# Patient Record
Sex: FEMALE | Race: WHITE
[De-identification: names, ages, dates, MRNs, and addresses within clinical notes are randomized per-mention and may not be internally consistent; named-entity substitution may affect disease eponyms.]

---

## 2019-08-30 ENCOUNTER — HOSPITAL ENCOUNTER (EMERGENCY)
Dept: HOSPITAL 94 - ER | Age: 39
LOS: 1 days | Discharge: LEFT BEFORE DISCHARGE | End: 2019-08-31
Payer: MEDICAID

## 2019-08-30 VITALS — BODY MASS INDEX: 21.11 KG/M2 | WEIGHT: 134.48 LBS | HEIGHT: 67 IN

## 2019-08-30 DIAGNOSIS — Z53.21: ICD-10-CM

## 2019-08-30 DIAGNOSIS — R30.9: Primary | ICD-10-CM

## 2019-08-30 PROCEDURE — 87491 CHLMYD TRACH DNA AMP PROBE: CPT

## 2019-08-30 PROCEDURE — 81025 URINE PREGNANCY TEST: CPT

## 2019-08-30 PROCEDURE — 81001 URINALYSIS AUTO W/SCOPE: CPT

## 2019-08-30 PROCEDURE — 87088 URINE BACTERIA CULTURE: CPT

## 2019-08-30 PROCEDURE — 36415 COLL VENOUS BLD VENIPUNCTURE: CPT

## 2019-08-31 VITALS — SYSTOLIC BLOOD PRESSURE: 116 MMHG | DIASTOLIC BLOOD PRESSURE: 80 MMHG

## 2019-08-31 LAB
BACTERIA URNS QL MICRO: (no result) /HPF
CLARITY UR: (no result)
COLOR UR: YELLOW
DEPRECATED SQUAMOUS URNS QL MICRO: (no result) /LPF
GLUCOSE UR STRIP-MCNC: NEGATIVE MG/DL
HCG UR QL: NEGATIVE
HGB UR QL STRIP: (no result)
KETONES UR STRIP-MCNC: NEGATIVE MG/DL
LEUKOCYTE ESTERASE UR QL STRIP: (no result)
MUCOUS THREADS URNS QL MICRO: (no result) /LPF
NITRITE UR QL STRIP: NEGATIVE
PH UR STRIP: 5.5 [PH] (ref 4.8–8)
PROT UR QL STRIP: 100 MG/DL
RBC #/AREA URNS HPF: (no result) /HPF (ref 0–2)
SP GR UR STRIP: >=1.03 (ref 1–1.03)
URN COLLECT METHOD CLASS: (no result)
UROBILINOGEN UR STRIP-MCNC: 0.2 E.U/DL (ref 0.2–1)
WBC #/AREA URNS HPF: (no result) /HPF (ref 0–4)

## 2019-11-16 ENCOUNTER — HOSPITAL ENCOUNTER (EMERGENCY)
Dept: HOSPITAL 94 - ER | Age: 39
Discharge: HOME | End: 2019-11-16
Payer: MEDICAID

## 2019-11-16 VITALS — HEIGHT: 67 IN | BODY MASS INDEX: 18.34 KG/M2 | WEIGHT: 116.84 LBS

## 2019-11-16 VITALS — DIASTOLIC BLOOD PRESSURE: 79 MMHG | SYSTOLIC BLOOD PRESSURE: 113 MMHG

## 2019-11-16 DIAGNOSIS — H10.89: Primary | ICD-10-CM

## 2019-11-16 DIAGNOSIS — Z79.899: ICD-10-CM

## 2019-11-16 DIAGNOSIS — F11.90: ICD-10-CM

## 2019-11-16 DIAGNOSIS — Z88.1: ICD-10-CM

## 2019-11-16 DIAGNOSIS — Z86.14: ICD-10-CM

## 2019-11-16 DIAGNOSIS — J06.9: ICD-10-CM

## 2019-11-16 DIAGNOSIS — F15.90: ICD-10-CM

## 2019-11-16 DIAGNOSIS — Z98.890: ICD-10-CM

## 2019-11-16 PROCEDURE — 99283 EMERGENCY DEPT VISIT LOW MDM: CPT

## 2020-01-09 ENCOUNTER — HOSPITAL ENCOUNTER (EMERGENCY)
Dept: HOSPITAL 94 - ER | Age: 40
Discharge: HOME | End: 2020-01-09
Payer: COMMERCIAL

## 2020-01-09 VITALS — DIASTOLIC BLOOD PRESSURE: 72 MMHG | SYSTOLIC BLOOD PRESSURE: 105 MMHG

## 2020-01-09 VITALS — BODY MASS INDEX: 22.02 KG/M2 | HEIGHT: 67 IN | WEIGHT: 140.3 LBS

## 2020-01-09 DIAGNOSIS — Z88.1: ICD-10-CM

## 2020-01-09 DIAGNOSIS — F15.90: ICD-10-CM

## 2020-01-09 DIAGNOSIS — K02.9: ICD-10-CM

## 2020-01-09 DIAGNOSIS — F11.90: ICD-10-CM

## 2020-01-09 DIAGNOSIS — Z98.890: ICD-10-CM

## 2020-01-09 DIAGNOSIS — K04.7: Primary | ICD-10-CM

## 2020-01-09 DIAGNOSIS — Z86.14: ICD-10-CM

## 2020-01-09 PROCEDURE — 99283 EMERGENCY DEPT VISIT LOW MDM: CPT

## 2020-02-20 ENCOUNTER — HOSPITAL ENCOUNTER (EMERGENCY)
Dept: HOSPITAL 94 - ER | Age: 40
Discharge: HOME | End: 2020-02-20
Payer: COMMERCIAL

## 2020-02-20 VITALS — WEIGHT: 138.3 LBS | BODY MASS INDEX: 21.71 KG/M2 | HEIGHT: 67 IN

## 2020-02-20 VITALS — SYSTOLIC BLOOD PRESSURE: 92 MMHG | DIASTOLIC BLOOD PRESSURE: 63 MMHG

## 2020-02-20 DIAGNOSIS — F11.90: ICD-10-CM

## 2020-02-20 DIAGNOSIS — Z79.899: ICD-10-CM

## 2020-02-20 DIAGNOSIS — Z86.69: ICD-10-CM

## 2020-02-20 DIAGNOSIS — L02.413: Primary | ICD-10-CM

## 2020-02-20 DIAGNOSIS — Z86.14: ICD-10-CM

## 2020-02-20 DIAGNOSIS — Z98.890: ICD-10-CM

## 2020-02-20 DIAGNOSIS — Z88.8: ICD-10-CM

## 2020-02-20 DIAGNOSIS — F15.90: ICD-10-CM

## 2020-02-20 PROCEDURE — 96372 THER/PROPH/DIAG INJ SC/IM: CPT

## 2020-02-20 PROCEDURE — 99284 EMERGENCY DEPT VISIT MOD MDM: CPT

## 2020-02-20 PROCEDURE — 10060 I&D ABSCESS SIMPLE/SINGLE: CPT

## 2020-09-05 ENCOUNTER — HOSPITAL ENCOUNTER (EMERGENCY)
Dept: HOSPITAL 94 - ER | Age: 40
Discharge: HOME | End: 2020-09-05
Payer: MEDICAID

## 2020-09-05 VITALS — DIASTOLIC BLOOD PRESSURE: 94 MMHG | SYSTOLIC BLOOD PRESSURE: 151 MMHG

## 2020-09-05 VITALS — HEIGHT: 67 IN | WEIGHT: 140.3 LBS | BODY MASS INDEX: 22.02 KG/M2

## 2020-09-05 DIAGNOSIS — L02.416: ICD-10-CM

## 2020-09-05 DIAGNOSIS — F11.10: ICD-10-CM

## 2020-09-05 DIAGNOSIS — Z88.8: ICD-10-CM

## 2020-09-05 DIAGNOSIS — L03.116: Primary | ICD-10-CM

## 2020-09-05 DIAGNOSIS — Z86.14: ICD-10-CM

## 2020-09-05 DIAGNOSIS — Z79.2: ICD-10-CM

## 2020-09-05 DIAGNOSIS — Z79.899: ICD-10-CM

## 2020-09-05 DIAGNOSIS — Z98.890: ICD-10-CM

## 2020-09-05 DIAGNOSIS — F15.90: ICD-10-CM

## 2020-09-05 DIAGNOSIS — R22.43: ICD-10-CM

## 2020-09-05 DIAGNOSIS — Z86.69: ICD-10-CM

## 2020-09-05 PROCEDURE — 96372 THER/PROPH/DIAG INJ SC/IM: CPT

## 2020-09-05 PROCEDURE — 90715 TDAP VACCINE 7 YRS/> IM: CPT

## 2020-09-05 PROCEDURE — 99284 EMERGENCY DEPT VISIT MOD MDM: CPT

## 2020-09-05 PROCEDURE — 90471 IMMUNIZATION ADMIN: CPT

## 2020-10-25 ENCOUNTER — HOSPITAL ENCOUNTER (EMERGENCY)
Dept: HOSPITAL 94 - ER | Age: 40
Discharge: LEFT BEFORE DISCHARGE | End: 2020-10-25
Payer: MEDICAID

## 2020-10-25 VITALS — BODY MASS INDEX: 30.07 KG/M2 | WEIGHT: 198.42 LBS | HEIGHT: 68 IN

## 2020-10-25 VITALS — SYSTOLIC BLOOD PRESSURE: 113 MMHG | DIASTOLIC BLOOD PRESSURE: 73 MMHG

## 2020-10-25 DIAGNOSIS — Z53.21: ICD-10-CM

## 2020-10-25 DIAGNOSIS — R10.9: Primary | ICD-10-CM

## 2020-10-25 NOTE — NUR
Left before being seen, was seen leaving facility on skateboard.  Not necessary 
to call back patient per Dr Dean.

## 2020-11-07 ENCOUNTER — HOSPITAL ENCOUNTER (EMERGENCY)
Dept: HOSPITAL 94 - ER | Age: 40
Discharge: HOME | End: 2020-11-07
Payer: MEDICAID

## 2020-11-07 VITALS — WEIGHT: 145.31 LBS | HEIGHT: 67 IN | BODY MASS INDEX: 22.81 KG/M2

## 2020-11-07 VITALS — DIASTOLIC BLOOD PRESSURE: 73 MMHG | SYSTOLIC BLOOD PRESSURE: 107 MMHG

## 2020-11-07 DIAGNOSIS — R05: Primary | ICD-10-CM

## 2020-11-07 DIAGNOSIS — R43.9: ICD-10-CM

## 2020-11-07 DIAGNOSIS — F11.10: ICD-10-CM

## 2020-11-07 DIAGNOSIS — Z88.8: ICD-10-CM

## 2020-11-07 DIAGNOSIS — Z79.899: ICD-10-CM

## 2020-11-07 DIAGNOSIS — Z98.890: ICD-10-CM

## 2020-11-07 DIAGNOSIS — F15.10: ICD-10-CM

## 2020-11-07 DIAGNOSIS — R09.81: ICD-10-CM

## 2020-11-07 DIAGNOSIS — Z20.828: ICD-10-CM

## 2020-11-07 PROCEDURE — 87635 SARS-COV-2 COVID-19 AMP PRB: CPT

## 2020-11-07 PROCEDURE — 36415 COLL VENOUS BLD VENIPUNCTURE: CPT

## 2020-11-07 PROCEDURE — 99283 EMERGENCY DEPT VISIT LOW MDM: CPT

## 2021-08-01 ENCOUNTER — HOSPITAL ENCOUNTER (EMERGENCY)
Dept: HOSPITAL 94 - ER | Age: 41
Discharge: HOME | End: 2021-08-01
Payer: COMMERCIAL

## 2021-08-01 VITALS — HEIGHT: 72 IN | BODY MASS INDEX: 39.68 KG/M2 | WEIGHT: 293 LBS

## 2021-08-01 VITALS — SYSTOLIC BLOOD PRESSURE: 113 MMHG | DIASTOLIC BLOOD PRESSURE: 66 MMHG

## 2021-08-01 DIAGNOSIS — F15.90: ICD-10-CM

## 2021-08-01 DIAGNOSIS — F11.90: ICD-10-CM

## 2021-08-01 DIAGNOSIS — F17.200: ICD-10-CM

## 2021-08-01 DIAGNOSIS — Z79.899: ICD-10-CM

## 2021-08-01 DIAGNOSIS — Z88.8: ICD-10-CM

## 2021-08-01 DIAGNOSIS — M25.561: Primary | ICD-10-CM

## 2021-08-01 PROCEDURE — 99283 EMERGENCY DEPT VISIT LOW MDM: CPT

## 2021-08-01 PROCEDURE — 29505 APPLICATION LONG LEG SPLINT: CPT

## 2021-08-01 PROCEDURE — 73564 X-RAY EXAM KNEE 4 OR MORE: CPT
